# Patient Record
Sex: FEMALE | Race: WHITE
[De-identification: names, ages, dates, MRNs, and addresses within clinical notes are randomized per-mention and may not be internally consistent; named-entity substitution may affect disease eponyms.]

---

## 2022-09-25 ENCOUNTER — HOSPITAL ENCOUNTER (EMERGENCY)
Dept: HOSPITAL 75 - ER FS | Age: 47
Discharge: HOME | End: 2022-09-25
Payer: SELF-PAY

## 2022-09-25 VITALS — BODY MASS INDEX: 23.17 KG/M2 | HEIGHT: 66.02 IN | WEIGHT: 144.18 LBS

## 2022-09-25 VITALS — SYSTOLIC BLOOD PRESSURE: 132 MMHG | DIASTOLIC BLOOD PRESSURE: 75 MMHG

## 2022-09-25 DIAGNOSIS — Z72.0: ICD-10-CM

## 2022-09-25 DIAGNOSIS — R22.0: Primary | ICD-10-CM

## 2022-09-25 NOTE — ED GENERAL
General


Chief Complaint:  Allergic Reaction


Stated Complaint:  FACIAL SWELLING


Source of Information:  Patient





History of Present Illness


Date Seen by Provider:  Sep 25, 2022


Time Seen by Provider:  16:08


Initial Comments


47-year-old female presenting with facial swelling and tightness.  She states 

that this started when she woke up this morning.  It has progressed throughout 

the day.  She did try taking Benadryl which seemed to help some.  She denies any

difficulty swallowing or breathing.  She has no shortness of breath.  She denies

any new medications or taking anything new or different.  She denies any new f

oods make-up, perfumes, clothes, detergents or soaps.  She had facial swelling 

like this previously when she took sulfa antibiotics.


Timing/Duration:  12-24 Hours


Severity:  Moderate


Associated Systoms:  No Chest Pain, No Cough, No Diaphoresis, No Fever/Chills, 

No Headaches, No Loss of Appetite, No Malaise, No Nausea/Vomiting, No Rash, No 

Seizure, No Shortness of Air, No Syncope, No Weakness





Allergies and Home Medications


Allergies


Coded Allergies:  


     Sulfa (Sulfonamide Antibiotics) (Verified  Allergy, Intermediate, Angioe

gabe, 9/25/22)





Patient Home Medication List


Home Medication List Reviewed:  Yes


Prednisone (Prednisone) 20 Mg Tab, 40 MG PO DAILY


   Prescribed by: JANNY SHELLEY on 9/25/22 1640





Review of Systems


Review of Systems


Constitutional:  No chills, No fever


EENTM:  other (Diffuse face swelling right more than left); No ear discharge, No

ear pain, No blurred vision, No double vision, No dental problems, No epistaxis,

No nose congestion


Respiratory:  No cough, No short of breath, No stridor, No wheezing


Cardiovascular:  no symptoms reported


Gastrointestinal:  no symptoms reported


Genitourinary:  no symptoms reported


Musculoskeletal:  no symptoms reported


Skin:  see HPI


Psychiatric/Neurological:  No Symptoms Reported





Past Medical-Social-Family Hx


Patient Social History


Tobacco Use?:  Yes


Alcohol Use?:  Yes


Alcohol type:  Beer





Past Medical History


Cardiac:  Yes


Hypertension





Physical Exam


Vital Signs





Vital Signs - First Documented








 9/25/22 9/25/22





 16:10 17:25


 


Temp 36.6 


 


Pulse 104 


 


Resp 17 


 


B/P (MAP) 136/85 (102) 


 


Pulse Ox  99


 


O2 Delivery Room Air 





Capillary Refill :


Height, Weight, BMI


Height: '"


Weight: lbs. oz. kg;  BMI


Method:


General Appearance:  No Apparent Distress, WD/WN


HEENT:  PERRL/EOMI, Pharynx Normal, Moist Mucous Membranes, Other (Facial 

swelling from the lips on her face.)


Neck:  Full Range of Motion, Normal Inspection, Non Tender, Supple


Respiratory:  Chest Non Tender, Lungs Clear, Normal Breath Sounds, No Accessory 

Muscle Use, No Respiratory Distress


Cardiovascular:  Regular Rate, Rhythm, Normal Peripheral Pulses


Gastrointestinal:  Normal Bowel Sounds, No Pulsatile Mass, Non Tender, Soft


Extremity:  Normal Capillary Refill, Normal Inspection, No Pedal Edema


Neurologic/Psychiatric:  Alert, Oriented x3, CNs II-XII Norm as Tested


Skin:  Warm/Dry; No Rash; Other (facial swelling right more than left)





Progress/Results/Core Measures


Suspected Sepsis


SIRS


Temperature: 


Pulse:  


Respiratory Rate: 


 


Blood Pressure  / 


Mean:





Results/Orders


My Orders





Orders - JANNY SHELLEY MD


Ed Iv/Invasive Line Start (9/25/22 16:17)


Diphenhydramine Injection (Benadryl Inje (9/25/22 16:17)


Dexamethasone Injection (Decadron Inje (9/25/22 16:17)





Vital Signs/I&O











 9/25/22 9/25/22





 16:10 17:25


 


Temp 36.6 36.7


 


Pulse 104 91


 


Resp 17 17


 


B/P (MAP) 136/85 (102) 132/75


 


Pulse Ox  99


 


O2 Delivery Room Air Room Air





Capillary Refill :


Progress Note #1:  


Progress Note


Start an IV and give dexamethasone and Benadryl.  Monitor for improvement in fac

ial swelling.


Progress Note #2:  


   Time:  17:06


Progress Note


On recheck the patient is having significant improvement in her facial swelling.

 She continues to have no difficulty with breathing or swallowing.  With no 

definite source other than potential with lisinopril stressed the importance of 

holding that medicine and when discussing it she did mention she gets a cough 

from the lisinopril.  Especially with the combination of the facial swelling and

the cough would encourage patient to check with her primary about switching to a

different blood pressure medication.  In the meantime continue on the 

hydrochlorothiazide so that she has some blood pressure control.  Take steroids 

for the next few days and may continue with Benadryl if needed for swelling.  

Call her regular doctor in the morning to see how they want to manage her blood 

pressure and the lisinopril.





Departure


Impression





   Primary Impression:  


   Facial swelling


Disposition:  01 HOME, SELF-CARE


Condition:  Improved





Departure-Patient Inst.


Decision time for Depature:  17:16


Referrals:  


NO,LOCAL PHYSICIAN (PCP/Family)


Primary Care Physician


Patient Instructions:  Angioedema (DC), Angioedema Caused by ACE Inhibitor 

Medicines





Add. Discharge Instructions:  


Stop your Lisinopril for now. This is a possible cause of your cough and today's

facial swelling. 





Continue your hydrochlorothiazide for blood pressure. 





Call your doctor in the morning to see how they want to manage your blood 

pressure with concern for Lisinopril as the cause of your allergic reaction.





Continue steroids for next few days and Benadryl 25-50 mg every 4 hours as 

needed for swelling/itching. 





Seek medical care immediately if you have worsening symptoms, trouble breathing 

or trouble swallowing





All discharge instructions reviewed with patient and/or family. Voiced 

understanding.


Scripts


Prednisone (Prednisone) 20 Mg Tab


40 MG PO DAILY for facial swelling for 3 Days, #6 TAB 0 Refills


   Prov: JANNY SHELLEY MD         9/25/22











JANNY SHELLEY MD               Sep 25, 2022 16:25